# Patient Record
Sex: FEMALE | Race: WHITE | Employment: FULL TIME | URBAN - METROPOLITAN AREA
[De-identification: names, ages, dates, MRNs, and addresses within clinical notes are randomized per-mention and may not be internally consistent; named-entity substitution may affect disease eponyms.]

---

## 2019-09-26 ENCOUNTER — HOSPITAL ENCOUNTER (OUTPATIENT)
Dept: MAMMOGRAPHY | Age: 54
Discharge: HOME | End: 2019-09-26
Attending: FAMILY MEDICINE
Payer: COMMERCIAL

## 2019-09-26 DIAGNOSIS — Z12.31 ENCOUNTER FOR SCREENING MAMMOGRAM FOR MALIGNANT NEOPLASM OF BREAST: ICD-10-CM

## 2019-09-26 PROCEDURE — 77063 BREAST TOMOSYNTHESIS BI: CPT

## 2019-09-30 ENCOUNTER — HOSPITAL ENCOUNTER (OUTPATIENT)
Dept: PHYSICAL THERAPY | Age: 54
Discharge: HOME | End: 2019-09-30
Payer: COMMERCIAL

## 2019-09-30 PROCEDURE — 97165 OT EVAL LOW COMPLEX 30 MIN: CPT

## 2019-09-30 NOTE — THERAPY EVALUATION
Rosina Hines  : 1965  Primary: [facility]*  Secondary:  [facility] [place] [address] [facility], [address], [place] [address].  [phone]   [phone]           OUTPATIENT OCCUPATIONAL THERAPY:Initial Assessment 2019   ICD-10: Treatment Diagnosis:     Lymphedema, not elsewhere classified (I89.0)    Precautions/Allergies:   Codeine; Codeine-pyrilamine; and Tylox [oxycodone-acetaminophen]   TREATMENT PLAN:  Effective Dates: 2019 TO 2019 (90 days). Frequency/Duration:1-2x/week for 90 Day(s) MEDICAL/REFERRING DIAGNOSIS:  Lymphedema, not elsewhere classified [I89.0]   DATE OF ONSET: chornic   REFERRING PHYSICIAN: Nayeli Barragan MD MD Orders: eval and treat  Return MD Appointment: unknown     INITIAL ASSESSMENT:  Ms. Scarlet Paul presents with symptoms consistent with Stage 2 lymphedema. Upon evaluation, pt presents with pitting edema in bilateral lower extremities, most notably in bilateral ankles and calves. At this time, pt would benefit from skilled OT services to improve the self-management of lymphedema for increased ability to perform essential ADLs, IADLs, and works tasks. PROBLEM LIST (Impacting functional limitations):  1. Decreased Strength  2. Decreased ADL/Functional Activities  3. Increased Pain  4. Decreased Activity Tolerance  5. Edema/Girth INTERVENTIONS PLANNED: (Treatment may consist of any combination of the following)  1. Activities of daily living training  2. Adaptive equipment training  3. Community reintergration  4. Donning&doffing training  5. Manual therapy training  6. Modalities  7. Neuromuscular re-eduation  8. Re-evaluation  9. Therapeutic activity  10. Therapeutic exercise  11. Work reintegration       GOALS: (Goals have been discussed and agreed upon with patient.)    Short-Term Functional Goals: Time Frame: 45 days  1. Patient will verbalize signs/symptoms of lymphedema with 100% accuracy. 2. Patient/caregiver will be independent with donning/doffing compression bandaging. 3. Patient will be independent with skin-care regimen. Discharge Goals: Time Frame: 90 days   1. Patient's bilateral lower extremity  circumferential measurements will decrease by 1-3 cm to increase occupational performance during ADLs and IADLs. 2. Patient/caregiver will be independent with donning/doffing compression garments. 3. Patient will be independent with home management of lymphedema. OUTCOME MEASURE:   Tool Used: Lymphedema Life Impact Scale   Score:  Initial: 25 Most Recent: X (Date: -- )   Interpretation of Score: The Lymphedema Life Impact Scale (LLIS) is a validated instrument that measures the physical, functional, and psychosocial concerns pertinent to patients with extremity lymphedema. The Scale's questionnaire is administered to patients to gauge impairments, activity limitations, and participation restrictions resulting from their lymphedema. MEDICAL NECESSITY:   · Patient demonstrates good rehab potential due to higher previous functional level. REASON FOR SERVICES/OTHER COMMENTS:  · Patient continues to require skilled intervention due to medical complications and patient unable to attend/participate in therapy as expected. Total Duration:  OT Patient Time In/Time Out  Time In: 1520  Time Out: 1600    Rehabilitation Potential For Stated Goals: Good  Regarding Evelyn Quinones's therapy, I certify that the treatment plan above will be carried out by a therapist or under their direction. Thank you for this referral,  Jessica Radford OT     Referring Physician Signature: Jemima Singh MD _______________________________ Date _____________     PAIN/SUBJECTIVE:   Initial: Pain Intensity 1: 0  /10 Post Session:  0/10   OCCUPATIONAL PROFILE & HISTORY:   History of Injury/Illness (Reason for Referral):  Hx of double knee replacement in 6 years.  First noticed bilateral LE swelling when she started having children. States that her legs are normal when she first wakes up in the morning, however begin swelling within 10 minutes. Has worn compression stockings in the past but did nothing to help with the swelling. Reports significant pain when swelling occurs. Notes relatively mild swelling at time of evaluation; states that it can become significantly worse throughout the day. Past Medical History/Comorbidities:   Ms. Rashaun Kuhn  has no past medical history on file. Ms. Rashaun Kuhn  has a past surgical history that includes hx hysterectomy (); hx  section ( and ); and hx orthopaedic. 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 Prior Level of Function/Work/Activity:  Independent with ADls, IADLs, and functional mobility. Personal Factors:          Sex:  female        Age:  47 y.o. Ambulatory/Rehab Services H2 Model Falls Risk Assessment   Risk Factors:       No Risk Factors Identified Ability to Rise from Chair:       (0)  Ability to rise in a single movement   Falls Prevention Plan:       No modifications necessary   Total: (5 or greater = High Risk): 0    [facility] of [employer]. All Rights Reserved. [facility] Patent #[id number]. Federal Law prohibits the replication, distribution or use without written permission from [facility] [employer]   Current Medications:       Current Outpatient Medications:     indomethacin (INDOCIN) 50 mg capsule, 1 po tid, Disp: 42 Cap, Rfl: 0    hyoscyamine SL (LEVSIN/SL) 0.125 mg SL tablet, 1 Tab by SubLINGual route every four (4) hours as needed for Cramping.  Indications: DIARRHEA, Irritable Bowel Syndrome, Disp: 28 Tab, Rfl: 0    promethazine (PHENERGAN) 25 mg tablet, Take 1 Tab by mouth every six (6) hours as needed for Nausea., Disp: 28 Tab, Rfl: 0    HYDROcodone-acetaminophen (NORCO) 5-325 mg per tablet, , Disp: , Rfl: 0    naproxen (NAPROSYN) 500 mg tablet, , Disp: , Rfl: 0    ibuprofen (MOTRIN) 800 mg tablet, Take  by mouth., Disp: , Rfl:     multivitamin (ONE A DAY) tablet, Take 1 Tab by mouth daily. , Disp: , Rfl:    Date Last Reviewed:  9/30/2019     Complexity Level: Brief history (0):  LOW COMPLEXITY   ASSESSMENT OF OCCUPATIONAL PERFORMANCE:   Strength:          WDLs  Edema/Girth:  pitting    Left Right    Initial Most Recent Initial Most Recent   Upper  Extremity           Lower  Extremity 5th Tuberosity (cm): 21.5(17 cm from base of heel)  Ankle (cm): 32.5(6 cm from base of heel)  Calf (cm): 51(30 cm from base of heel)  Mid Knee (cm): 51.7   5th Tuberosity (cm): 22.3(17 cm from base of heel)  Ankle (cm): 30.2(6 cm from base of heel)  Calf (cm): 54(30 cm from base of heel)  Mid Knee (cm): 54.1         Physical Skills Involved:  1. Strength  2. Activity Tolerance  3. Edema Cognitive Skills Affected (resulting in the inability to perform in a timely and safe manner): 1. none Psychosocial Skills Affected:  1. Habits/Routines  2. Environmental Adaptation   Number of elements that affect the Plan of Care[de-identified] 3-5:  MODERATE COMPLEXITY   CLINICAL DECISION MAKING:   Clinical Decision-Making Assessment: This case is of low clinical decision making due to the pt's bilateral LE lymphedema being the main limiting factor to occupational performance. .     Assessment process, impact of co-morbidities, assessment modification\need for assistance, and selection of interventions: Analytical Complexity:LOW COMPLEXITY

## 2019-10-14 NOTE — PROGRESS NOTES
Jess Prabhakaravan  : 1965  Primary: Denise Judge [facility]*  Secondary:  [facility] [place] [address] [facility], [address], [place] [address].  [phone]   [phone]         OUTPATIENT OCCUPATIONAL THERAPY: Daily Treatment Note 10/16/2019  Visit Count:  2    ICD-10: Treatment Diagnosis:      Lymphedema, not elsewhere classified (I89.0)    Pre-treatment Symptoms/Complaints:  Pt states, \" I'm sorry I missed my last appointment. .. I have been so busy at work. \"  Pain: Initial: Pain Intensity 1: 0 /10 Post Session:  0/10   Medications Last Reviewed:  10/16/2019    Updated Objective Findings:  See Below   TREATMENT:   Edema/Girth:  pitting    Left Right    Initial Most Recent Initial Most Recent   Upper  Extremity           Lower  Extremity 5th Tuberosity (cm): 22  Ankle (cm): 34.2  Calf (cm): 52  Mid Knee (cm): 51.5   5th Tuberosity (cm): 22  Ankle (cm): 32  Calf (cm): 53.7  Mid Knee (cm): 53         Manual Therapy: (30 minutes): CDT was utilized and necessary because of the patient's Stage 2 lymphedema. Skin assessed and circumferential measurements taken today. Bilateral lower extremities wrapped from base of 5th metatarsal to popliteal crease using a series of short stretch bandages. Stockinette was first applied followed by a single layer of cotton padding. A 6 inch short stretch bandage was then applied to the foot following the Oskar Sandal technique. An 8 inch bandage was then applied to the foot/ankle following the HAS technique. Two 10-inch short stretch bandages were then applied to the ankle/calf using a spiral technique. Pt educated in wrapping technique as pt wished to learn how to self-wrap for home management. Pt verbalized understanding of all techniques. Pt advised to remove garments should there be any pain and pressure marks that result from use of compression bandages.      Ziliko Portal  Treatment/Session Summary: Initiated compression wrappings today with pt verbalizing understanding all techniques. Pt will attempt to complete self-bandaging at home this week and return next Wednesday. · Response to Treatment:  tolerated well with no issues noted. · Communication/Consultation:  compression bandage wearing schedule  · Equipment provided today:  compression wrappings  · Recommendations/Intent for next treatment session: Next visit will focus on decrease in circumferential measurements in bilateral lower extremities. \".     Total Treatment Billable Duration:  30 minutes  OT Patient Time In/Time Out  Time In: 0800  Time Out: [address], OT    Future Appointments   Date Time Provider Moris Alcocer   10/23/2019  8:00 AM PRASHANTH Aguirre   10/30/2019  8:00 AM PRASHANTH Aguirre

## 2019-10-16 ENCOUNTER — HOSPITAL ENCOUNTER (OUTPATIENT)
Dept: PHYSICAL THERAPY | Age: 54
Discharge: HOME | End: 2019-10-16
Payer: COMMERCIAL

## 2019-10-16 PROCEDURE — 97140 MANUAL THERAPY 1/> REGIONS: CPT

## 2020-02-03 NOTE — THERAPY EVALUATION
Jacob Nair  : 1965  Primary: Mandy Gonzales [facility]*  Secondary:  [facility] [place] [address] [facility], [address], [place] [address].  [phone]   [phone]           OUTPATIENT OCCUPATIONAL THERAPY:Initial Assessment and Discontinuation Summary 10/16/2019   ICD-10: Treatment Diagnosis:     Lymphedema, not elsewhere classified (I89.0)    Precautions/Allergies:   Codeine; Codeine-pyrilamine; and Tylox [oxycodone-acetaminophen]   TREATMENT PLAN:  Effective Dates: 2019 TO 2019 (90 days). Frequency/Duration:1-2x/week for 90 Day(s) MEDICAL/REFERRING DIAGNOSIS:  Lymphedema, not elsewhere classified [I89.0]   DATE OF ONSET: chornic   REFERRING PHYSICIAN: Jemima Singh MD MD Orders: eval and treat  Return MD Appointment: unknown   2/3/2020: Ms. Pilar Villareal did not return to OP lymphedema therapy following her 10/16/2019 visit. Ms. Pilar Villareal only attended a total of 2 therapy visits. Progress towards goals was not assessed. Will d/c from further OT services at this time. INITIAL ASSESSMENT:  Ms. Pilar Villareal presents with symptoms consistent with Stage 2 lymphedema. Upon evaluation, pt presents with pitting edema in bilateral lower extremities, most notably in bilateral ankles and calves. At this time, pt would benefit from skilled OT services to improve the self-management of lymphedema for increased ability to perform essential ADLs, IADLs, and works tasks. PROBLEM LIST (Impacting functional limitations):  1. Decreased Strength  2. Decreased ADL/Functional Activities  3. Increased Pain  4. Decreased Activity Tolerance  5. Edema/Girth INTERVENTIONS PLANNED: (Treatment may consist of any combination of the following)  1. Activities of daily living training  2. Adaptive equipment training  3. Community reintergration  4. Donning&doffing training  5. Manual therapy training  6. Modalities  7. Neuromuscular re-eduation  8. Re-evaluation  9.  Therapeutic activity  10. Therapeutic exercise  11. Work reintegration       GOALS: (Goals have been discussed and agreed upon with patient.)    Short-Term Functional Goals: Time Frame: 45 days  1. Patient will verbalize signs/symptoms of lymphedema with 100% accuracy.  - NOT MET  2. Patient/caregiver will be independent with donning/doffing compression bandaging. - NOT MET  3. Patient will be independent with skin-care regimen. - NOT MET  Discharge Goals: Time Frame: 90 days   1. Patient's bilateral lower extremity  circumferential measurements will decrease by 1-3 cm to increase occupational performance during ADLs and IADLs. - NOT MET  2. Patient/caregiver will be independent with donning/doffing compression garments. - NOT MET  3. Patient will be independent with home management of lymphedema. - NOT MET      OUTCOME MEASURE:   Tool Used: Lymphedema Life Impact Scale   Score:  Initial: 25 Most Recent: X (Date: -- )   Interpretation of Score: The Lymphedema Life Impact Scale (LLIS) is a validated instrument that measures the physical, functional, and psychosocial concerns pertinent to patients with extremity lymphedema. The Scale's questionnaire is administered to patients to gauge impairments, activity limitations, and participation restrictions resulting from their lymphedema. MEDICAL NECESSITY:   · Patient demonstrates good rehab potential due to higher previous functional level. REASON FOR SERVICES/OTHER COMMENTS:  · Patient continues to require skilled intervention due to medical complications and patient unable to attend/participate in therapy as expected. Total Duration:  OT Patient Time In/Time Out  Time In: 0800  Time Out: 0830    Rehabilitation Potential For Stated Goals: Good  Regarding Evelyn Quinones's therapy, I certify that the treatment plan above will be carried out by a therapist or under their direction.   Thank you for this referral,  Destinee Meehan, OT     Referring Physician Signature: Katlyn Garcia, Layo Rivas MD _______________________________ Date _____________     PAIN/SUBJECTIVE:   Initial: Pain Intensity 1: 0  /10 Post Session:  0/10   OCCUPATIONAL PROFILE & HISTORY:   History of Injury/Illness (Reason for Referral):  Hx of double knee replacement in 6 years. First noticed bilateral LE swelling when she started having children. States that her legs are normal when she first wakes up in the morning, however begin swelling within 10 minutes. Has worn compression stockings in the past but did nothing to help with the swelling. Reports significant pain when swelling occurs. Notes relatively mild swelling at time of evaluation; states that it can become significantly worse throughout the day. Past Medical History/Comorbidities:   Ms. Pilar Villareal  has no past medical history on file. Ms. Pilar Villareal  has a past surgical history that includes hx hysterectomy (); hx  section ( and ); and hx orthopaedic. 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 Prior Level of Function/Work/Activity:  Independent with ADls, IADLs, and functional mobility. Personal Factors:          Sex:  female        Age:  47 y.o. Ambulatory/Rehab Services H2 Model Falls Risk Assessment   Risk Factors:       No Risk Factors Identified Ability to Rise from Chair:       (0)  Ability to rise in a single movement   Falls Prevention Plan:       No modifications necessary   Total: (5 or greater = High Risk): 0    [facility] of [employer]. All Rights Reserved. [facility] States Patent #[id number]. Federal Law prohibits the replication, distribution or use without written permission from [facility] [employer]   Current Medications:       Current Outpatient Medications:     indomethacin (INDOCIN) 50 mg capsule, 1 po tid, Disp: 42 Cap, Rfl: 0    hyoscyamine SL (LEVSIN/SL) 0.125 mg SL tablet, 1 Tab by SubLINGual route every four (4) hours as needed for Cramping.  Indications: DIARRHEA, Irritable Bowel Syndrome, Disp: 28 Tab, Rfl: 0    promethazine (PHENERGAN) 25 mg tablet, Take 1 Tab by mouth every six (6) hours as needed for Nausea., Disp: 28 Tab, Rfl: 0    HYDROcodone-acetaminophen (NORCO) 5-325 mg per tablet, , Disp: , Rfl: 0    naproxen (NAPROSYN) 500 mg tablet, , Disp: , Rfl: 0    ibuprofen (MOTRIN) 800 mg tablet, Take  by mouth., Disp: , Rfl:     multivitamin (ONE A DAY) tablet, Take 1 Tab by mouth daily. , Disp: , Rfl:    Date Last Reviewed:  2/3/2020     Complexity Level: Brief history (0):  LOW COMPLEXITY   ASSESSMENT OF OCCUPATIONAL PERFORMANCE:   Strength:          WDLs  Edema/Girth:  pitting    Left Right    Initial Most Recent Initial Most Recent   Upper  Extremity           Lower  Extremity 5th Tuberosity (cm): 22  Ankle (cm): 34.2  Calf (cm): 52  Mid Knee (cm): 51.5   5th Tuberosity (cm): 22  Ankle (cm): 32  Calf (cm): 53.7  Mid Knee (cm): 53         Physical Skills Involved:  1. Strength  2. Activity Tolerance  3. Edema Cognitive Skills Affected (resulting in the inability to perform in a timely and safe manner): 1. none Psychosocial Skills Affected:  1. Habits/Routines  2. Environmental Adaptation   Number of elements that affect the Plan of Care[de-identified] 3-5:  MODERATE COMPLEXITY   CLINICAL DECISION MAKING:   Clinical Decision-Making Assessment: This case is of low clinical decision making due to the pt's bilateral LE lymphedema being the main limiting factor to occupational performance. .     Assessment process, impact of co-morbidities, assessment modification\need for assistance, and selection of interventions: Analytical Complexity:LOW COMPLEXITY

## 2021-06-18 ENCOUNTER — TRANSCRIBE ORDER (OUTPATIENT)
Dept: SCHEDULING | Age: 56
End: 2021-06-18

## 2021-06-18 DIAGNOSIS — Z12.31 VISIT FOR SCREENING MAMMOGRAM: Primary | ICD-10-CM

## 2021-06-30 ENCOUNTER — HOSPITAL ENCOUNTER (OUTPATIENT)
Dept: MAMMOGRAPHY | Age: 56
Discharge: HOME | End: 2021-06-30
Attending: FAMILY MEDICINE
Payer: COMMERCIAL

## 2021-06-30 DIAGNOSIS — Z12.31 VISIT FOR SCREENING MAMMOGRAM: ICD-10-CM

## 2021-06-30 PROCEDURE — 77067 SCR MAMMO BI INCL CAD: CPT

## 2021-08-04 ENCOUNTER — HOSPITAL ENCOUNTER (OUTPATIENT)
Dept: MAMMOGRAPHY | Age: 56
Discharge: HOME | End: 2021-08-04
Attending: FAMILY MEDICINE
Payer: COMMERCIAL

## 2021-08-04 DIAGNOSIS — R92.8 ABNORMAL SCREENING MAMMOGRAM: ICD-10-CM

## 2021-08-04 PROCEDURE — 77065 DX MAMMO INCL CAD UNI: CPT

## 2021-08-04 PROCEDURE — 76642 ULTRASOUND BREAST LIMITED: CPT

## 2022-10-05 NOTE — THERAPY EVALUATION
Tiffany Carlos : 1965 Primary: [facility]* Secondary:  [facility] [place] [id number] [facility], [address], [place] [address]. [phone]   [phone] OUTPATIENT OCCUPATIONAL THERAPY:Initial Assessment 10/16/2019 ICD-10: Treatment Diagnosis:  
 
Lymphedema, not elsewhere classified (I89.0) Precautions/Allergies:  
Codeine; Codeine-pyrilamine; and Tylox [oxycodone-acetaminophen] TREATMENT PLAN: 
Effective Dates: 2019 TO 2019 (90 days). Frequency/Duration:1-2x/week for 90 Day(s) MEDICAL/REFERRING DIAGNOSIS: 
Lymphedema, not elsewhere classified [I89.0] DATE OF ONSET: parker REFERRING PHYSICIAN: Ana Dutta MD MD Orders: eval and treat Return MD Appointment: unknown INITIAL ASSESSMENT:  Ms. Nain Bridges presents with symptoms consistent with Stage 2 lymphedema. Upon evaluation, pt presents with pitting edema in bilateral lower extremities, most notably in bilateral ankles and calves. At this time, pt would benefit from skilled OT services to improve the self-management of lymphedema for increased ability to perform essential ADLs, IADLs, and works tasks. PROBLEM LIST (Impacting functional limitations): 1. Decreased Strength 2. Decreased ADL/Functional Activities 3. Increased Pain 4. Decreased Activity Tolerance 5. Edema/Girth INTERVENTIONS PLANNED: (Treatment may consist of any combination of the following) 1. Activities of daily living training 2. Adaptive equipment training 3. Community reintergration 4. Donning&doffing training 5. Manual therapy training 6. Modalities 7. Neuromuscular re-eduation 8. Re-evaluation 9. Therapeutic activity 10. Therapeutic exercise 11. Work reintegration GOALS: (Goals have been discussed and agreed upon with patient.) Short-Term Functional Goals: Time Frame: 45 days 1. Patient will verbalize signs/symptoms of lymphedema with 100% accuracy. 2. Patient/caregiver will be independent with donning/doffing compression bandaging. 3. Patient will be independent with skin-care regimen. Discharge Goals: Time Frame: 90 days 1. Patient's bilateral lower extremity  circumferential measurements will decrease by 1-3 cm to increase occupational performance during ADLs and IADLs. 2. Patient/caregiver will be independent with donning/doffing compression garments. 3. Patient will be independent with home management of lymphedema. OUTCOME MEASURE:  
Tool Used: Lymphedema Life Impact Scale Score:  Initial: 25 Most Recent: X (Date: -- ) Interpretation of Score: The Lymphedema Life Impact Scale (LLIS) is a validated instrument that measures the physical, functional, and psychosocial concerns pertinent to patients with extremity lymphedema. The Scale's questionnaire is administered to patients to gauge impairments, activity limitations, and participation restrictions resulting from their lymphedema. MEDICAL NECESSITY:  
· Patient demonstrates good rehab potential due to higher previous functional level. REASON FOR SERVICES/OTHER COMMENTS: 
· Patient continues to require skilled intervention due to medical complications and patient unable to attend/participate in therapy as expected. Total Duration: 
  
 
Rehabilitation Potential For Stated Goals: Good Regarding Husam Quinones's therapy, I certify that the treatment plan above will be carried out by a therapist or under their direction. Thank you for this referral, 
Hussain Madrigal OT Referring Physician Signature: Hafsa James MD _______________________________ Date _____________ PAIN/SUBJECTIVE:  
Initial:    /10 Post Session:  0/10 OCCUPATIONAL PROFILE & HISTORY:  
History of Injury/Illness (Reason for Referral): Hx of double knee replacement in 6 years. First noticed bilateral LE swelling when she started having children.  States that her legs are normal when she first wakes up in the morning, however begin swelling within 10 minutes. Has worn compression stockings in the past but did nothing to help with the swelling. Reports significant pain when swelling occurs. Notes relatively mild swelling at time of evaluation; states that it can become significantly worse throughout the day. Past Medical History/Comorbidities: Ms. Steve Hickman  has no past medical history on file. Ms. Steve Hickman  has a past surgical history that includes hx hysterectomy (); hx  section ( and ); and hx orthopaedic. Social History/Living Environment:  
 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 Prior Level of Function/Work/Activity: 
Independent with ADls, IADLs, and functional mobility. Personal Factors:   
      Sex:  female Age:  47 y.o. Ambulatory/Rehab Services H2 Model Falls Risk Assessment Risk Factors: 
     No Risk Factors Identified Ability to Rise from Chair: 
     (0)  Ability to rise in a single movement Falls Prevention Plan: No modifications necessary Total: (5 or greater = High Risk): 0  
 [facility] [address] [place] [address] States Patent #[id number]. Federal Law prohibits the replication, distribution or use without written permission from [facility] Current Medications:   
  
Current Outpatient Medications:  
  indomethacin (INDOCIN) 50 mg capsule, 1 po tid, Disp: 42 Cap, Rfl: 0 
  hyoscyamine SL (LEVSIN/SL) 0.125 mg SL tablet, 1 Tab by SubLINGual route every four (4) hours as needed for Cramping.  Indications: DIARRHEA, Irritable Bowel Syndrome, Disp: 28 Tab, Rfl: 0 
  promethazine (PHENERGAN) 25 mg tablet, Take 1 Tab by mouth every six (6) hours as needed for Nausea., Disp: 28 Tab, Rfl: 0 
  HYDROcodone-acetaminophen (NORCO) 5-325 mg per tablet, , Disp: , Rfl: 0 
  naproxen (NAPROSYN) 500 mg tablet, , Disp: , Rfl: 0 
  ibuprofen (MOTRIN) 800 mg tablet, Take  by mouth., Disp: , Rfl:  
   multivitamin (ONE A DAY) tablet, Take 1 Tab by mouth daily. , Disp: , Rfl:   
Date Last Reviewed:  10/14/2019 Complexity Level: Brief history (0):  LOW COMPLEXITY ASSESSMENT OF OCCUPATIONAL PERFORMANCE:  
Strength: WDLs Edema/Girth:  pitting Left Right Initial Most Recent Initial Most Recent Upper Extremity Lower Extremity Physical Skills Involved: 1. Strength 2. Activity Tolerance 3. Edema Cognitive Skills Affected (resulting in the inability to perform in a timely and safe manner): 1. none Psychosocial Skills Affected: 1. Habits/Routines 2. Environmental Adaptation Number of elements that affect the Plan of Care[de-identified] 3-5:  MODERATE COMPLEXITY CLINICAL DECISION MAKING:  
Clinical Decision-Making Assessment: This case is of low clinical decision making due to the pt's bilateral LE lymphedema being the main limiting factor to occupational performance. Ivette Baxter Assessment process, impact of co-morbidities, assessment modification\need for assistance, and selection of interventions: Analytical Complexity:LOW COMPLEXITY Abbe Flap (Upper To Lower Lip) Text: The defect of the lower lip was assessed and measured.  Given the location and size of the defect, an Abbe flap was deemed most appropriate.  Using a sterile surgical marker, an appropriate Abbe flap was measured and drawn on the upper lip. Local anesthesia was then infiltrated.  A scalpel was then used to incise the upper lip through and through the skin, vermilion, muscle and mucosa, leaving the flap pedicled on the opposite side.  The flap was then rotated and transferred to the lower lip defect.  The flap was then sutured into place with a three layer technique, closing the orbicularis oris muscle layer with subcutaneous buried sutures, followed by a mucosal layer and an epidermal layer.

## 2023-04-05 ENCOUNTER — APPOINTMENT (OUTPATIENT)
Dept: PHYSICAL THERAPY | Age: 58
End: 2023-04-05
Payer: COMMERCIAL

## 2023-04-20 ENCOUNTER — HOSPITAL ENCOUNTER (OUTPATIENT)
Dept: PHYSICAL THERAPY | Age: 58
Setting detail: RECURRING SERIES
Discharge: HOME | End: 2023-04-23
Payer: COMMERCIAL

## 2023-04-20 PROCEDURE — 97166 OT EVAL MOD COMPLEX 45 MIN: CPT

## 2023-04-20 PROCEDURE — 97535 SELF CARE MNGMENT TRAINING: CPT

## 2023-04-20 ASSESSMENT — PAIN SCALES - GENERAL: PAINLEVEL_OUTOF10: 6

## 2023-04-25 ENCOUNTER — HOSPITAL ENCOUNTER (OUTPATIENT)
Dept: PHYSICAL THERAPY | Age: 58
Setting detail: RECURRING SERIES
Discharge: HOME | End: 2023-04-28
Payer: COMMERCIAL

## 2023-04-25 PROCEDURE — 97140 MANUAL THERAPY 1/> REGIONS: CPT

## 2023-04-25 PROCEDURE — 97535 SELF CARE MNGMENT TRAINING: CPT

## 2023-04-25 ASSESSMENT — PAIN SCALES - GENERAL: PAINLEVEL_OUTOF10: 8

## 2023-04-25 NOTE — PROGRESS NOTES
Meghana Misa  : 1965  Primary: Alonzo Mcdonald Sc ([employer])  Secondary:  [address] @ [facility]  [address] [place] [address]  Phone: [phone]  Fax: [phone]    >OT Visit Info:   Plan Frequency: 2x/week  Certification Period Expiration Date: 23  Total # of Visits to Date: 2     Visit Count: Visit count could not be calculated. Make sure you are using a visit which is associated with an episode. OUTPATIENT OCCUPATIONAL THERAPY: Treatment Note 2023  Episode: (BLE lymphedema)   Appt Desk      Treatment Diagnosis:  I89 lymphedema ,not elsewhere specified                                         I89.022 lymphedema with obesity  Medical/Referring Diagnosis:  Lymphedema, not elsewhere classified [I89.0]  Referring Physician:  Paige Amaral MD MD Orders:  OT Eval and Treat   Date of Onset:  No data recorded   Allergies:  Codeine and Oxycodone-acetaminophen  Restrictions/Precautions:    No data recorded  No data recorded  Medications Last Reviewed:  2023     Interventions Planned: (Treatment may consist of any combination of the following)  No data recorded   >Subjective Comments:\"I want my lower legs to be smaller than my upper legs. \"     >Initial:     8/10 >Post Session:     8/10  Medications Last Reviewed:  2023  Updated Objective Findings:  See evaluation note from today  Treatment   MANUAL THERAPY: ( 40 minutes):   Manual lymphatic drainage was utilized and necessary because of the patient's  BLE lymphedema .      Manual Lymph Drainage:   Lymph Nodes:    Cervical Supraclavicular Axillary Abdominal Inguinal Popliteal Antecubital   RIGHT []     [x]     [x]     []     [x]     [x]     [x]       LEFT []     [x]     [x]     []     [x]     [x]     [x]         Anastamoses:   Axillo-axillary Inguino-inguinal Axillo-inguinal Inguino-axillary   ANTERIOR []     []     []     [x]       POSTERIOR []     []     []     []       RIGHT []     []     []

## 2023-04-27 ENCOUNTER — HOSPITAL ENCOUNTER (OUTPATIENT)
Dept: PHYSICAL THERAPY | Age: 58
Setting detail: RECURRING SERIES
Discharge: HOME | End: 2023-04-30
Payer: COMMERCIAL

## 2023-04-27 PROCEDURE — 97535 SELF CARE MNGMENT TRAINING: CPT

## 2023-04-27 PROCEDURE — 97140 MANUAL THERAPY 1/> REGIONS: CPT

## 2023-04-27 ASSESSMENT — PAIN SCALES - GENERAL: PAINLEVEL_OUTOF10: 8

## 2023-05-02 ENCOUNTER — HOSPITAL ENCOUNTER (OUTPATIENT)
Dept: PHYSICAL THERAPY | Age: 58
Setting detail: RECURRING SERIES
Discharge: HOME | End: 2023-05-05
Payer: COMMERCIAL

## 2023-05-02 PROCEDURE — 97140 MANUAL THERAPY 1/> REGIONS: CPT

## 2023-05-02 PROCEDURE — 97535 SELF CARE MNGMENT TRAINING: CPT

## 2023-05-02 ASSESSMENT — PAIN SCALES - GENERAL: PAINLEVEL_OUTOF10: 8

## 2023-05-02 NOTE — PROGRESS NOTES
Neto Michelle  : 1965  Primary: Vicci Pump Sc ([employer])  Secondary:  [address] @ [facility]  [address] [place] [id number]  Phone: [phone]  Fax: [phone]    >OT Visit Info:   Plan Frequency: 2x/week  Certification Period Expiration Date: 23  Total # of Visits to Date: 4     Visit Count: Visit count could not be calculated. Make sure you are using a visit which is associated with an episode. OUTPATIENT OCCUPATIONAL THERAPY: Treatment Note 2023  Episode: (BLE lymphedema)   Appt Desk      Treatment Diagnosis:  I89 lymphedema ,not elsewhere specified                                         I89.022 lymphedema with obesity  Medical/Referring Diagnosis:  Lymphedema, not elsewhere classified [I89.0]  Referring Physician:  Luca Alarcon MD MD Orders:  OT Eval and Treat   Date of Onset:  No data recorded   Allergies:  Codeine and Oxycodone-acetaminophen  Restrictions/Precautions:    No data recorded  No data recorded  Medications Last Reviewed:  2023     Interventions Planned: (Treatment may consist of any combination of the following)  No data recorded   >Subjective Comments:\"the bandages stayed on with the biagrip underneath. I think I did it ok and my legs look smaller. It takes me so long to put the bandages on though. Do you think I can get velcro wraps soon? .\"     >Initial:     8/10 >Post Session:     8/10  Medications Last Reviewed:  2023  Updated Objective Findings:  See evaluation note from today  Treatment   MANUAL THERAPY: (40 minutes): Patient arrived with bandages on she applied using adequate technique. Once removed BLE's were visibly smaller in appearance. BLE's were measured from the knee to foot and since therapy began she has lost 26cm in RLE limb size and 26.5cm in LLE limb size since therapy began. Tissue also softer over anterior R calf.   Manual lymphatic drainage was utilized and necessary because of the patient's  BLE

## 2023-05-04 ENCOUNTER — HOSPITAL ENCOUNTER (OUTPATIENT)
Dept: PHYSICAL THERAPY | Age: 58
Setting detail: RECURRING SERIES
Discharge: HOME | End: 2023-05-07
Payer: COMMERCIAL

## 2023-05-04 PROCEDURE — 97535 SELF CARE MNGMENT TRAINING: CPT

## 2023-05-04 PROCEDURE — 97140 MANUAL THERAPY 1/> REGIONS: CPT

## 2023-05-04 ASSESSMENT — PAIN SCALES - GENERAL: PAINLEVEL_OUTOF10: 8

## 2023-05-09 ENCOUNTER — HOSPITAL ENCOUNTER (OUTPATIENT)
Dept: PHYSICAL THERAPY | Age: 58
Setting detail: RECURRING SERIES
Discharge: HOME | End: 2023-05-12
Payer: COMMERCIAL

## 2023-05-09 PROCEDURE — 97140 MANUAL THERAPY 1/> REGIONS: CPT

## 2023-05-09 PROCEDURE — 97535 SELF CARE MNGMENT TRAINING: CPT

## 2023-05-09 ASSESSMENT — PAIN SCALES - GENERAL: PAINLEVEL_OUTOF10: 8

## 2023-05-11 ENCOUNTER — HOSPITAL ENCOUNTER (OUTPATIENT)
Dept: PHYSICAL THERAPY | Age: 58
Setting detail: RECURRING SERIES
Discharge: HOME | End: 2023-05-14
Payer: COMMERCIAL

## 2023-05-11 PROCEDURE — 97535 SELF CARE MNGMENT TRAINING: CPT

## 2023-05-11 PROCEDURE — 97140 MANUAL THERAPY 1/> REGIONS: CPT

## 2023-05-11 ASSESSMENT — PAIN SCALES - GENERAL: PAINLEVEL_OUTOF10: 4

## 2023-05-16 ENCOUNTER — HOSPITAL ENCOUNTER (OUTPATIENT)
Dept: PHYSICAL THERAPY | Age: 58
Setting detail: RECURRING SERIES
Discharge: HOME | End: 2023-05-19
Payer: COMMERCIAL

## 2023-05-16 PROCEDURE — 97535 SELF CARE MNGMENT TRAINING: CPT

## 2023-05-16 PROCEDURE — 97140 MANUAL THERAPY 1/> REGIONS: CPT

## 2023-05-16 ASSESSMENT — PAIN SCALES - GENERAL: PAINLEVEL_OUTOF10: 4

## 2023-05-16 NOTE — PROGRESS NOTES
Kamilah Lowers  : 1965  Primary: Kelly Barbosa Sc ([employer])  Secondary:  [address] @ [facility]  [address] [place] [id number]  Phone: [phone]  Fax: [phone]    >OT Visit Info:   Plan Frequency: 2x/week  Certification Period Expiration Date: 23  Total # of Visits to Date: 8     Visit Count: Visit count could not be calculated. Make sure you are using a visit which is associated with an episode. OUTPATIENT OCCUPATIONAL THERAPY: Treatment Note 2023  Episode: (BLE lymphedema)   Appt Desk      Treatment Diagnosis:  I89 lymphedema ,not elsewhere specified                                         I89.022 lymphedema with obesity  Medical/Referring Diagnosis:  Lymphedema, not elsewhere classified [I89.0]  Referring Physician:  Veronica Laureano MD MD Orders:  OT Eval and Treat   Date of Onset:  No data recorded   Allergies:  Codeine and Oxycodone-acetaminophen  Restrictions/Precautions:    No data recorded  No data recorded  Medications Last Reviewed:  2023     Interventions Planned: (Treatment may consist of any combination of the following)  No data recorded   >Subjective Comments: Patient has no new complaints and remains very compliant with treatment. She is anxious to get long term garments. \"I was out of town this weekend and wore my bandages. I did not wear them one day because they were being washed and I could tell how my legs started swelling without the bandages on.\"   >Initial:     4/10 >Post Session:     4/10  Medications Last Reviewed:  2023  Updated Objective Findings:  See evaluation note from today  Treatment   MANUAL THERAPY: (45 minutes): Patient arrived with bandages on she applied using adequate technique. Once removed BLE's were visibly smaller in appearance. Since therapy began she has lost 26cm in RLE limb size and 26.5cm in LLE limb size since therapy began. Tissue also softer over anterior R calf.   Manual lymphatic

## 2023-05-18 ENCOUNTER — HOSPITAL ENCOUNTER (OUTPATIENT)
Dept: PHYSICAL THERAPY | Age: 58
Setting detail: RECURRING SERIES
Discharge: HOME | End: 2023-05-21
Payer: COMMERCIAL

## 2023-05-18 PROCEDURE — 97140 MANUAL THERAPY 1/> REGIONS: CPT

## 2023-05-18 PROCEDURE — 97535 SELF CARE MNGMENT TRAINING: CPT

## 2023-05-18 ASSESSMENT — PAIN SCALES - GENERAL: PAINLEVEL_OUTOF10: 4

## 2023-05-23 ENCOUNTER — HOSPITAL ENCOUNTER (OUTPATIENT)
Dept: PHYSICAL THERAPY | Age: 58
Setting detail: RECURRING SERIES
Discharge: HOME | End: 2023-05-26
Payer: COMMERCIAL

## 2023-05-23 PROCEDURE — 97535 SELF CARE MNGMENT TRAINING: CPT

## 2023-05-23 PROCEDURE — 97140 MANUAL THERAPY 1/> REGIONS: CPT

## 2023-05-23 ASSESSMENT — PAIN SCALES - GENERAL: PAINLEVEL_OUTOF10: 5

## 2023-05-23 NOTE — PROGRESS NOTES
None  Recommendations/Intent for next treatment session: Next visit will focus on lymphedema treatment guidelines to decrease BLE lymphedema and patient education for self management principles for lymphedema.     >Total Treatment Billable Duration:   OT Individual Minutes  Time In: 1200  Time Out: [address] [email or web address]  Minutes: [address], OTR/L, CLT    Post Session Pain  Charge Capture   POC Link  Treatment Note Link  MD Guidelines  MyChart    Future Appointments   Date Time Provider Sky De La Torreisti   5/25/2023 10:00 AM Rajinder Henson OT [facility]   6/5/2023  4:00 PM [facility] ELEANOR BI ROOM 3 SFERMAM [facility]   6/5/2023  7:54 PM SFE CT 16 SLICE UNIT 1 SFERCT SFE

## 2023-05-25 ENCOUNTER — HOSPITAL ENCOUNTER (OUTPATIENT)
Dept: PHYSICAL THERAPY | Age: 58
Setting detail: RECURRING SERIES
Discharge: HOME | End: 2023-05-28
Payer: COMMERCIAL

## 2023-05-25 PROCEDURE — 97535 SELF CARE MNGMENT TRAINING: CPT

## 2023-05-25 ASSESSMENT — PAIN SCALES - GENERAL: PAINLEVEL_OUTOF10: 5

## 2023-06-05 ENCOUNTER — HOSPITAL ENCOUNTER (OUTPATIENT)
Dept: CT IMAGING | Age: 58
Discharge: HOME | End: 2023-06-08
Payer: COMMERCIAL

## 2023-06-05 ENCOUNTER — HOSPITAL ENCOUNTER (OUTPATIENT)
Dept: MAMMOGRAPHY | Age: 58
Discharge: HOME | End: 2023-06-08
Payer: COMMERCIAL

## 2023-06-05 DIAGNOSIS — R91.1 LUNG NODULE: ICD-10-CM

## 2023-06-05 DIAGNOSIS — Z12.31 VISIT FOR SCREENING MAMMOGRAM: ICD-10-CM

## 2023-06-05 PROCEDURE — 77067 SCR MAMMO BI INCL CAD: CPT

## 2023-06-05 PROCEDURE — 71250 CT THORAX DX C-: CPT

## 2023-06-06 ENCOUNTER — HOSPITAL ENCOUNTER (OUTPATIENT)
Dept: PHYSICAL THERAPY | Age: 58
Setting detail: RECURRING SERIES
Discharge: HOME | End: 2023-06-09
Payer: COMMERCIAL

## 2023-06-06 PROCEDURE — 97140 MANUAL THERAPY 1/> REGIONS: CPT

## 2023-06-06 PROCEDURE — 97535 SELF CARE MNGMENT TRAINING: CPT

## 2023-06-06 ASSESSMENT — PAIN SCALES - GENERAL: PAINLEVEL_OUTOF10: 1

## 2023-06-06 NOTE — PROGRESS NOTES
Antonina Flores  : 1965  Primary: Ya Manzanares ([employer])  Secondary:  [address] @ [facility]  [address] [place] [address]  Phone: [phone]  Fax: [phone]    >OT Visit Info:   Plan Frequency: 2x/week  Certification Period Expiration Date: 23  Total # of Visits to Date: 12     Visit Count: Visit count could not be calculated. Make sure you are using a visit which is associated with an episode. OUTPATIENT OCCUPATIONAL THERAPY:Treatment Note 2023  Episode: (BLE lymphedema)   Appt Desk      Treatment Diagnosis:  I89 lymphedema ,not elsewhere specified                                         I89.022 lymphedema with obesity  Medical/Referring Diagnosis:  Lymphedema, not elsewhere classified [I89.0]  Referring Physician:  Zeferino Mayberry MD MD Orders:  OT Eval and Treat   Date of Onset:  No data recorded   Allergies:  Codeine and Oxycodone-acetaminophen  Restrictions/Precautions:    No data recorded  No data recorded  Medications Last Reviewed:  2023     Interventions Planned: (Treatment may consist of any combination of the following)  No data recorded   >Subjective Comments: Patient has been wearing Circaid Juxtafit wraps with biagrip and 1 short stretch bandage underneath since last seen. \"I am now walking 1 mile everyday in my neighborhood and my legs don't hurt anymore!\"  >Initial:     1/10 >Post Session:     1/10  Medications Last Reviewed:  2023  Updated Objective Findings:  none  Treatment   MANUAL THERAPY: ( 15 minutes): Manual lymphatic drainage was then utilized and necessary because of the patient's  BLE lymphedema .    Manual Lymph Drainage:   Lymph Nodes:    Cervical Supraclavicular Axillary Abdominal Inguinal Popliteal Antecubital   RIGHT []     [x]     [x]     []     [x]     [x]     []       LEFT []     [x]     [x]     []     [x]     [x]     []         Anastamoses:   Axillo-axillary Inguino-inguinal Axillo-inguinal

## 2024-03-25 ENCOUNTER — HOSPITAL ENCOUNTER (OUTPATIENT)
Dept: CT IMAGING | Age: 59
Discharge: HOME | End: 2024-03-28

## 2024-03-25 DIAGNOSIS — R91.1 LUNG NODULE: ICD-10-CM
